# Patient Record
Sex: MALE | URBAN - METROPOLITAN AREA
[De-identification: names, ages, dates, MRNs, and addresses within clinical notes are randomized per-mention and may not be internally consistent; named-entity substitution may affect disease eponyms.]

---

## 2020-01-28 ENCOUNTER — APPOINTMENT (OUTPATIENT)
Dept: URBAN - METROPOLITAN AREA SURGERY 9 | Age: 85
Setting detail: DERMATOLOGY
End: 2020-01-28

## 2020-01-28 VITALS — RESPIRATION RATE: 14 BRPM | HEART RATE: 60 BPM | DIASTOLIC BLOOD PRESSURE: 78 MMHG | SYSTOLIC BLOOD PRESSURE: 142 MMHG

## 2020-01-28 PROBLEM — C44.212 BASAL CELL CARCINOMA OF SKIN OF RIGHT EAR AND EXTERNAL AURICULAR CANAL: Status: ACTIVE | Noted: 2020-01-28

## 2020-01-28 PROCEDURE — 17312 MOHS ADDL STAGE: CPT

## 2020-01-28 PROCEDURE — OTHER CONSULTATION FOR MOHS SURGERY: OTHER

## 2020-01-28 PROCEDURE — 15120 SPLT AGRFT F/S/N/H/F/G/M 1ST: CPT

## 2020-01-28 PROCEDURE — 17311 MOHS 1 STAGE H/N/HF/G: CPT

## 2020-01-28 PROCEDURE — OTHER RETURN TO REFERRING PROVIDER: OTHER

## 2020-01-28 PROCEDURE — OTHER MOHS SURGERY: OTHER

## 2020-01-28 PROCEDURE — OTHER PRESCRIPTION: OTHER

## 2020-01-28 NOTE — PROCEDURE: MOHS SURGERY
Body Location Override (Optional - Billing Will Still Be Based On Selected Body Map Location If Applicable): right mid helical rim

## 2020-01-28 NOTE — PROCEDURE: CONSULTATION FOR MOHS SURGERY
X Size Of Lesion In Cm (Optional): 0
Body Location Override (Optional - Billing Will Still Be Based On Selected Body Map Location If Applicable): right mid helical rim
Incorporate Mauc In Note: No
Detail Level: Detailed

## 2024-03-15 NOTE — PROCEDURE: MOHS SURGERY
[facility]    [address] [place] [address]    Phone:  [phone]    Fax:  [phone]       Thank You for choosing us for your health care visit. We are glad to serve you and happy to provide you with this summary of your visit. Please help us to ensure we have accurate records. If you find anything that needs to be changed, please let our staff know as soon as possible.          Your Demographic Information     Patient Name Sex     Eleno Villanueva Male 2006       Ethnic Group Patient Race    Not of  or  Origin White      Your Visit Details     Date & Time Provider Department    2017 3:30 PM Harvinder Christiansen MD [facility]      Your To Do List     Follow-Up    Return in about 1 year (around 2018).      We Ordered or Performed the Following     HPV QUADRIVALENT VACCINE     MENINGOCOCCAL CONJUGATE MCV4O (MENVEO)     TETANUS/DIPHTHERIA/ACELLULAR PERTUSSIS 10+ (BOOSTRIX)       Conditions Discussed Today or Order-Related Diagnoses        Comments    Encounter for routine child health examination without abnormal findings    -  Primary     Need for vaccination           Your Vitals Were     BP Pulse Temp Height Weight BMI    110/58 (75 %/ 41 %)* 88 98.8 °F (37.1 °C) (Tympanic) 4' 7\" (1.397 m) (26 %, Z= -0.63)† 66 lb 3.2 oz (30 kg) (13 %, Z= -1.12)† 15.39 kg/m2 (15 %, Z= -1.04)†    Smoking Status                   Never Smoker         *BP percentiles are based on NHBPEP's 4th Report    †Growth percentiles are based on CDC 2-20 Years data.      Medications Prescribed or Re-Ordered Today     None      Your Current Medications Are        Disp Refills Start End    ibuprofen (MOTRIN) 100 MG/5ML suspension        Sig - Route: Take  by mouth every 8 hours as needed. - Oral    Class: Historical Med      Discontinued Medications        Reason for Discontinue    amoxicillin (AMOXIL) 400 MG/5ML suspension Therapy  RN spoke with patient mother.  She needs a letter saying he has been diagnosed with ADHD.  Mother is Ok with this being sent via [employer].    Completed      Allergies     No Known Allergies      Immunizations History as of 2/17/2017     Name Date    DTaP 4/4/2007    DTaP/Hep B/IPV 2006, 2006, 2006    DTaP/IPV 1/14/2011    HIB 1/2/2007, 2006, 2006    HPV QUAD 2/17/2017    INFLUENZA QUADRIVALENT 10/20/2016    Influenza 10/30/2012, 11/10/2011, 1/14/2011, 10/14/2010, 10/8/2009, 11/11/2008, 11/5/2007, 2006, 2006    Influenza Quadrivalent Live 10/13/2015, 10/16/2014, 12/10/2013    MMR 1/2/2007    MMRV 1/14/2011    Meningococcal Conjugate MCV4O (Menveo) 2/17/2017    Pneumococcal Conjugate 7 Valent 4/4/2007, 2006, 2006, 2006    Tdap 2/17/2017    Varicella 1/2/2007              Patient Instructions          Your Child's Health  Preteen Visit      Eleno Villanueva  February 17, 2017    Visit Vitals   • /58   • Pulse 88   • Temp 98.8 °F (37.1 °C) (Tympanic)   • Ht 4' 7\" (1.397 m)   • Wt 30 kg   • BMI 15.39 kg/m2     Weight: 66.2 lbs      NUTRITION: Children in this age range will be under increasing peer pressure to eat junk food and to emulate celebrity teens pushing soda and other junk foods.  Common problems are excess weight gain, low calcium intake, and high fat intake.  The majority of bone calcium is formed at this age range; if the diet is low in calcium, early osteoporosis may result.  Teens concerned about weight may want to consider low-fat dairy products or calcium-fortified juices.  Non-milk dairy products most often do not contain vitamin D, but soy milk does.  Continue to emphasize fruits and vegetables as snacks.  Try to set a good example yourself.  A multivitamin with 600 International Units vitamin D should be given to all children who drink less than 32 oz of milk per day.    SAFETY:  Accidents are the most common cause of death in this age range.  Deaths from automobile, pedestrian, and bike accidents, falls, fire, and drowning are at the top of the list.  Be firm:  No seat belt, no go, no  fun.  The seat belt should be across the hips and not across the stomach.  Eleno should ride in the back seat.  The center seat is the safest if it has a shoulder harness.  Be willing to call off the activity, or Eleno will call your bluff.    It is important to teach Eleno about gun safety even if there are no guns in the home.  If you do have guns at home, make sure they are locked, unloaded, and with ammunition stored separately.    Teach bike-riding limits.  Insist on helmets and protective gear for anything with wheels (skateboards, roller blades, roller skates, scooters, etc.).  Wrist fractures are very common, and wrist braces are a good investment.    Children in this age range are fascinated by power equipment but are not able to use it safely.  They may do all right when things are going well, but they will panic easily.    Backpacks and book bags have become an increasing source of injury.  They should be limited to 15 percent of the child's body weight and carried over both shoulders.       DEVELOPMENT:  By the age of eight or nine, a typical child:  1.  Has an attention span of about one hour.  2.  Is learning to tell time.  3.  Is capable of chores without constant verbal reminders (although a written list is helpful).  4.  Is very concerned about rules and fairness.  He or she will be very angry and upset if another child is excused from chores or other obligations because of other activities, even if the parents perceive those activities as important (for example, sports, music, clubs).  Your child will refuse healthy foods if parents don't eat them.  5.  Can read for enjoyment.  6.  Has career plans, although they are often unrealistic and tend to focus on becoming a professional sports star or an entertainer.  Your children may also imagine themselves having the same job as their parents or other adults they know well.     Watch with interest the improvement in Eleno's reasoning ability.  At  the age of 6 or 7, children will often think that a tall, thin container holds more than a slightly shorter, wider one.  They will think that two quarters are more money than one dollar, and they may not know which numbers are bigger despite being able to count.  By contrast,  8-11 year olds can tell that both height and width are important.  They can follow from three to five commands in a row - for example:  (1) Go to your room, and (2) get your socks and (3) shoes and (4) jacket and (5) back pack.  Twelve-year-olds can tell you how to figure out which of two containers holds more liquid.    All children in this age range will choose fun activities before chores, homework, or even family activities.  They often have an unrealistic understanding of how long it will take to complete a task - for example, \"I can finish vacuuming in five minutes.\"  They may expect to finish a large homework assignment beginning at 9:00 pm on Adam night.  Insist on work before pleasure, but allow breaks.  Try to teach Eleno to break down chores and homework into smaller pieces consistent with his attention span.    DON'T DO THE CHORES FOR Eleno in the mistaken belief that he will be grateful.  Instead, he will regard you as his servant and respect you less.  Do not allow distractions. TV and loud music rarely help children do their homework.  Set a minimum time at the dinner table.    BEHAVIOR PROBLEMS:  The most common causes are problems with friends or schoolmates.  If there has been a major change in Hajas behavior, the cause may be:  1.  Rejection by a friend.  2.  Death or separation in the family.  3.  Arguments between parents, especially if one parent has threatened to leave.  4.  Fears - commonly weather, kidnapping, or robbery.  These fears are often set off by real events, scary movies, or television.  In general, children are much more upset by what happens to children on TV than by what happens to adults.  5.   Bullies.  6.  Violent TV shows, movies, or video games.  Boys in particular tend to play \"chicken\" with their friends to see who can watch the scariest movie.    Until age 8 or 9, most children will tell lies to avoid taking responsibility for mistakes or misbehavior.  Correct them privately and make them replace anything that was stolen, broken or gained by cheating.  Be careful about what you do and what you say in your child's presence.  If you want Eleno to tell the truth and obey laws, then you should too.    Listen carefully to conversations among Eleno and him friends while you are driving.  They often forget that you are there.  It is amazing what you can learn.  Try not to interrupt.  You can correct him later.  As one famous man said, \"I never learned a thing while I was talking.\"    MEDICATION FOR FEVER OR PAIN:   Acetaminophen liquid (e.g., Tylenol or Tempra) may be given every four hours as needed for pain or fever. Acetaminophen liquid is less concentrated than the infant dropper bottle type. Be sure to check which product CONCENTRATION you are using.    CHILDREN’S Tylenol/Acetaminophen  (160 MG/5 mL)    Child’s Weight:  Dose:  36 - 47 pounds:    240 mg (7.5 mL (1 1/2 Teaspoons))  48 - 59 pounds:    320 mg (10.0 mL (2 Teaspoons))  60 - 71 pounds:    400 mg (12.5 mL (2 1/2 Teaspoons))  72 - 95 pounds:    480 mg (15.0 mL (3 Teaspoons))  Greater than 96 pounds:   640 mg (20.0 mL (4 Teaspoons))    CHILDREN’S Tylenol/Acetaminophen MELTAWAYS ( 80 MG tablets)    Child’s Weight:  Dose:  36 - 47 pounds:    240 mg (3 meltaway tablets)  48 - 59 pounds:    320 mg (4 meltaway tablets)  60 - 71 pounds:    400 mg (5 meltaway tablets)  72 - 95 pounds:    480 mg (6 meltaway tablets)  Greater than 96 pounds:   640 mg (8 meltaway tablets)    Francisco (Jr) Tylenol/Acetaminophen MELTAWAYS (160 MG tablets)    Child’s Weight:  Dose:  36 - 47 pounds:    240 mg (1 1/2 meltaway tablets)  48 - 59 pounds:    320 mg (2 meltaway  tablets)  60 - 71 pounds:    400 mg (2 1/2 meltaway tablets)  72 - 95 pounds:    480 mg (3 meltaway tablets)  Greater than 96 pounds:   640 mg (4 meltaway tablets)    CHILDREN'S Ibuprofen (e.g., Advil or Motrin) may be given every six hours as needed for pain or fever.    48 - 59 pounds:    200 mg (2 Teaspoons)  60 - 71 pounds:    250 mg (2 1/2 Teaspoons)  72 - 95 pounds:    300 mg (3 Teaspoons)        NEXT VISIT:  IN 1 YEAR    Thank you for entrusting your care to [facility].        Lazy S Intermediate Repair Preamble Text (Leave Blank If You Do Not Want): Undermining was performed with blunt dissection.